# Patient Record
Sex: FEMALE | Race: WHITE | Employment: FULL TIME | URBAN - NONMETROPOLITAN AREA
[De-identification: names, ages, dates, MRNs, and addresses within clinical notes are randomized per-mention and may not be internally consistent; named-entity substitution may affect disease eponyms.]

---

## 2017-04-20 LAB — PAP-ABSTRACT: NORMAL

## 2017-10-25 ENCOUNTER — TRANSFERRED RECORDS (OUTPATIENT)
Dept: HEALTH INFORMATION MANAGEMENT | Facility: HOSPITAL | Age: 32
End: 2017-10-25

## 2017-11-01 ENCOUNTER — HOSPITAL ENCOUNTER (INPATIENT)
Facility: HOSPITAL | Age: 32
LOS: 1 days | Discharge: HOME | End: 2017-11-02
Attending: FAMILY MEDICINE | Admitting: FAMILY MEDICINE
Payer: COMMERCIAL

## 2017-11-01 DIAGNOSIS — O09.293: Primary | ICD-10-CM

## 2017-11-01 DIAGNOSIS — Z3A.39 39 WEEKS GESTATION OF PREGNANCY: ICD-10-CM

## 2017-11-01 LAB
ERYTHROCYTE [DISTWIDTH] IN BLOOD BY AUTOMATED COUNT: 13.5 % (ref 10–15)
HCT VFR BLD AUTO: 37.5 % (ref 35–47)
HGB BLD-MCNC: 12.8 G/DL (ref 11.7–15.7)
MCH RBC QN AUTO: 31.8 PG (ref 26.5–33)
MCHC RBC AUTO-ENTMCNC: 34.1 G/DL (ref 31.5–36.5)
MCV RBC AUTO: 93 FL (ref 78–100)
PLATELET # BLD AUTO: 283 10E9/L (ref 150–450)
RBC # BLD AUTO: 4.03 10E12/L (ref 3.8–5.2)
WBC # BLD AUTO: 13.4 10E9/L (ref 4–11)

## 2017-11-01 PROCEDURE — 72200001 ZZH LABOR CARE VAGINAL DELIVERY SINGLE

## 2017-11-01 PROCEDURE — 36415 COLL VENOUS BLD VENIPUNCTURE: CPT | Performed by: FAMILY MEDICINE

## 2017-11-01 PROCEDURE — G0378 HOSPITAL OBSERVATION PER HR: HCPCS

## 2017-11-01 PROCEDURE — S0191 MISOPROSTOL, ORAL, 200 MCG: HCPCS | Performed by: FAMILY MEDICINE

## 2017-11-01 PROCEDURE — 12000029 ZZH R&B OB INTERMEDIATE

## 2017-11-01 PROCEDURE — 25000128 H RX IP 250 OP 636

## 2017-11-01 PROCEDURE — 25000132 ZZH RX MED GY IP 250 OP 250 PS 637: Performed by: FAMILY MEDICINE

## 2017-11-01 PROCEDURE — 12000027 ZZH R&B OB

## 2017-11-01 PROCEDURE — 85027 COMPLETE CBC AUTOMATED: CPT | Performed by: FAMILY MEDICINE

## 2017-11-01 PROCEDURE — 10907ZC DRAINAGE OF AMNIOTIC FLUID, THERAPEUTIC FROM PRODUCTS OF CONCEPTION, VIA NATURAL OR ARTIFICIAL OPENING: ICD-10-PCS | Performed by: FAMILY MEDICINE

## 2017-11-01 PROCEDURE — 25000132 ZZH RX MED GY IP 250 OP 250 PS 637

## 2017-11-01 RX ORDER — IBUPROFEN 800 MG/1
800 TABLET, FILM COATED ORAL
Status: COMPLETED | OUTPATIENT
Start: 2017-11-01 | End: 2017-11-01

## 2017-11-01 RX ORDER — ACETAMINOPHEN 325 MG/1
650 TABLET ORAL EVERY 4 HOURS PRN
Status: DISCONTINUED | OUTPATIENT
Start: 2017-11-01 | End: 2017-11-02 | Stop reason: HOSPADM

## 2017-11-01 RX ORDER — LANOLIN 100 %
OINTMENT (GRAM) TOPICAL
Status: DISCONTINUED | OUTPATIENT
Start: 2017-11-01 | End: 2017-11-02 | Stop reason: HOSPADM

## 2017-11-01 RX ORDER — OXYTOCIN 10 [USP'U]/ML
10 INJECTION, SOLUTION INTRAMUSCULAR; INTRAVENOUS ONCE
Status: COMPLETED | OUTPATIENT
Start: 2017-11-01 | End: 2017-11-01

## 2017-11-01 RX ORDER — MISOPROSTOL 200 UG/1
800 TABLET ORAL
Status: DISCONTINUED | OUTPATIENT
Start: 2017-11-01 | End: 2017-11-02 | Stop reason: HOSPADM

## 2017-11-01 RX ORDER — ONDANSETRON 2 MG/ML
4 INJECTION INTRAMUSCULAR; INTRAVENOUS EVERY 6 HOURS PRN
Status: DISCONTINUED | OUTPATIENT
Start: 2017-11-01 | End: 2017-11-02 | Stop reason: HOSPADM

## 2017-11-01 RX ORDER — NALOXONE HYDROCHLORIDE 0.4 MG/ML
.1-.4 INJECTION, SOLUTION INTRAMUSCULAR; INTRAVENOUS; SUBCUTANEOUS
Status: DISCONTINUED | OUTPATIENT
Start: 2017-11-01 | End: 2017-11-02 | Stop reason: HOSPADM

## 2017-11-01 RX ORDER — OXYTOCIN/0.9 % SODIUM CHLORIDE 30/500 ML
PLASTIC BAG, INJECTION (ML) INTRAVENOUS
Status: DISPENSED
Start: 2017-11-01 | End: 2017-11-02

## 2017-11-01 RX ORDER — FENTANYL CITRATE 50 UG/ML
50-100 INJECTION, SOLUTION INTRAMUSCULAR; INTRAVENOUS
Status: DISCONTINUED | OUTPATIENT
Start: 2017-11-01 | End: 2017-11-02 | Stop reason: HOSPADM

## 2017-11-01 RX ORDER — OXYTOCIN 10 [USP'U]/ML
10 INJECTION, SOLUTION INTRAMUSCULAR; INTRAVENOUS
Status: DISCONTINUED | OUTPATIENT
Start: 2017-11-01 | End: 2017-11-02 | Stop reason: HOSPADM

## 2017-11-01 RX ORDER — IBUPROFEN 800 MG/1
TABLET, FILM COATED ORAL
Status: COMPLETED
Start: 2017-11-01 | End: 2017-11-01

## 2017-11-01 RX ORDER — OXYTOCIN/0.9 % SODIUM CHLORIDE 30/500 ML
340 PLASTIC BAG, INJECTION (ML) INTRAVENOUS CONTINUOUS PRN
Status: DISCONTINUED | OUTPATIENT
Start: 2017-11-01 | End: 2017-11-02 | Stop reason: HOSPADM

## 2017-11-01 RX ORDER — OXYTOCIN 10 [USP'U]/ML
INJECTION, SOLUTION INTRAMUSCULAR; INTRAVENOUS
Status: COMPLETED
Start: 2017-11-01 | End: 2017-11-01

## 2017-11-01 RX ORDER — OXYCODONE HYDROCHLORIDE 5 MG/1
5-10 TABLET ORAL
Status: DISCONTINUED | OUTPATIENT
Start: 2017-11-01 | End: 2017-11-02 | Stop reason: HOSPADM

## 2017-11-01 RX ORDER — MISOPROSTOL 200 UG/1
200 TABLET ORAL
Status: DISCONTINUED | OUTPATIENT
Start: 2017-11-01 | End: 2017-11-02 | Stop reason: HOSPADM

## 2017-11-01 RX ORDER — IBUPROFEN 400 MG/1
400-800 TABLET, FILM COATED ORAL EVERY 6 HOURS PRN
Status: DISCONTINUED | OUTPATIENT
Start: 2017-11-01 | End: 2017-11-02 | Stop reason: HOSPADM

## 2017-11-01 RX ORDER — OXYCODONE AND ACETAMINOPHEN 5; 325 MG/1; MG/1
1 TABLET ORAL
Status: DISCONTINUED | OUTPATIENT
Start: 2017-11-01 | End: 2017-11-02 | Stop reason: HOSPADM

## 2017-11-01 RX ORDER — MISOPROSTOL 200 UG/1
200 TABLET ORAL
Status: CANCELLED | OUTPATIENT
Start: 2017-11-01

## 2017-11-01 RX ORDER — ACETAMINOPHEN 325 MG/1
650 TABLET ORAL EVERY 4 HOURS PRN
Status: DISCONTINUED | OUTPATIENT
Start: 2017-11-01 | End: 2017-11-01

## 2017-11-01 RX ADMIN — OXYTOCIN 10 UNITS: 10 INJECTION, SOLUTION INTRAMUSCULAR; INTRAVENOUS at 17:17

## 2017-11-01 RX ADMIN — MISOPROSTOL 20 MCG: 200 TABLET ORAL at 11:19

## 2017-11-01 RX ADMIN — MISOPROSTOL 40 MCG: 200 TABLET ORAL at 12:23

## 2017-11-01 RX ADMIN — MISOPROSTOL 20 MCG: 200 TABLET ORAL at 10:14

## 2017-11-01 RX ADMIN — IBUPROFEN 800 MG: 800 TABLET, FILM COATED ORAL at 17:32

## 2017-11-01 RX ADMIN — MISOPROSTOL 60 MCG: 200 TABLET ORAL at 14:40

## 2017-11-01 RX ADMIN — MISOPROSTOL 60 MCG: 200 TABLET ORAL at 13:30

## 2017-11-01 RX ADMIN — IBUPROFEN 800 MG: 800 TABLET ORAL at 17:32

## 2017-11-01 NOTE — IP AVS SNAPSHOT
MRN:[id number]                      After Visit Summary   11/1/2017    Brooke A Lund Blumberg    MRN: [id number]           Thank you!     Thank you for choosing [facility] for your care. Our goal is always to provide you with excellent care. Hearing back from our patients is one way we can continue to improve our services. Please take a few minutes to complete the written survey that you may receive in the mail after you visit with us. Thank you!        Patient Information     Date Of Birth          1985        Designated Caregiver       Most Recent Value    Caregiver    Will someone help with your care after discharge? yes    Name of designated caregiver Saurabh    Phone number of caregiver [phone]    Caregiver address [address] [place] [address]      About your hospital stay     You were admitted on:  November 1, 2017 You last received care in the:  [facility]    You were discharged on:  November 2, 2017        Reason for your hospital stay       Have a baby                  Who to Call     For medical emergencies, please call 911.  For non-urgent questions about your medical care, please call your primary care provider or clinic, [phone]          Attending Provider     Provider Specialty    Jake Nash MD Family Practice       Primary Care Provider Office Phone # Fax #    Jake Nash [phone]      Follow-up Appointments     Follow-up and recommended labs and tests        See me in one month for postpartum check.                  Further instructions from your care team         Postpartum Vaginal Delivery Instructions    Activity       Ask family and friends for help when you need it.    Do not place anything in your vagina for 6 weeks.    You are not restricted on other activities, but take it easy for a few weeks to allow your body to recover from delivery.  You are able to do any activities you feel up to that point.    No driving until  "you have stopped taking your pain medications (usually two weeks after delivery).     Call your health care provider if you have any of these symptoms:       Increased pain, swelling, redness, or fluid around your stiches from an episiotomy or perineal tear.    A fever above 100.4 F (38 C) with or without chills when placing a thermometer under your tongue.    You soak a sanitary pad with blood within 1 hour, or you see blood clots larger than a golf ball.    Bleeding that lasts more than 6 weeks.    Vaginal discharge that smells bad.    Severe pain, cramping or tenderness in your lower belly area.    A need to urinate more frequently (use the toilet more often), more urgently (use the toilet very quickly), or it burns when you urinate.    Nausea and vomiting.    Redness, swelling or pain around a vein in your leg.    Problems breastfeeding or a red or painful area on your breast.    Chest pain and cough or are gasping for air.    Problems coping with sadness, anxiety, or depression.  If you have any concerns about hurting yourself or the baby, call your provider immediately.     You have questions or concerns after you return home.     Keep your hands clean:  Always wash your hands before touching your perineal area and stitches.  This helps reduce your risk of infection.  If your hands aren't dirty, you may use an alcohol hand-rub to clean your hands. Keep your nails clean and short.        Pending Results     No orders found for last 3 day(s).            Admission Information     Date & Time Provider Department Dept. Phone    11/1/2017 Jake Nash MD [facility] [phone]      Your Vitals Were     Blood Pressure Pulse Temperature Respirations Height Weight    121/73 75 98.1  F (36.7  C) (Oral) 16 1.778 m (5' 10\") 109.8 kg (242 lb)    Pulse Oximetry BMI (Body Mass Index)                98% 34.72 kg/m2          MyChart Information     [facility] lets you send messages to your doctor, view your test " "results, renew your prescriptions, schedule appointments and more. To sign up, go to [email or web address] . Click on \"Log in\" on the left side of the screen, which will take you to the Welcome page. Then click on \"Sign up Now\" on the right side of the page.     You will be asked to enter the access code listed below, as well as some personal information. Please follow the directions to create your username and password.     Your access code is: [email or web address]  Expires: 2018  7:19 PM     Your access code will  in 90 days. If you need help or a new code, please call your [facility] clinic or [phone].        Care EveryWhere ID     This is your Care EveryWhere ID. This could be used by other organizations to access your [facility] medical records  [id number]        Equal Access to Services     KAYLA BECERRA : Gelacio Torres, ronald scott, jeni putnam, kev baptiste . So [facility] [phone].    ATENCIÓN: Si habla español, tiene a treviño disposición servicios gratuitos de asistencia lingüística. Hiro al [phone].    We comply with applicable federal civil rights laws and [place] laws. We do not discriminate on the basis of race, color, national origin, age, disability, sex, sexual orientation, or gender identity.               Review of your medicines      START taking        Dose / Directions    ibuprofen 200 MG tablet   Commonly known as:  ADVIL/MOTRIN        Dose:  600 mg   Take 3 tablets (600 mg) by mouth every 6 hours as needed for other (cramping)   Refills:  0         CONTINUE these medicines which have NOT CHANGED        Dose / Directions    lanolin ointment        Apply topically every hour as needed for dry skin (sore nipples)   Refills:  0       prenatal multivitamin plus iron 27-0.8 MG Tabs per tablet        Dose:  1 tablet   Take 1 tablet by mouth daily   Refills:  0       witch hazel-glycerin pad   Commonly known as:  TUCKS        " Apply topically every hour as needed for hemorrhoids   Refills:  0            Where to get your medicines      Some of these will need a paper prescription and others can be bought over the counter. Ask your nurse if you have questions.     You don't need a prescription for these medications     ibuprofen 200 MG tablet                Protect others around you: Learn how to safely use, store and throw away your medicines at www.disposemymeds.org.             Medication List: This is a list of all your medications and when to take them. Check marks below indicate your daily home schedule. Keep this list as a reference.      Medications           Morning Afternoon Evening Bedtime As Needed    ibuprofen 200 MG tablet   Commonly known as:  ADVIL/MOTRIN   Take 3 tablets (600 mg) by mouth every 6 hours as needed for other (cramping)   Last time this was given:  800 mg on 11/1/2017  5:32 PM                                lanolin ointment   Apply topically every hour as needed for dry skin (sore nipples)                                prenatal multivitamin plus iron 27-0.8 MG Tabs per tablet   Take 1 tablet by mouth daily                                witch hazel-glycerin pad   Commonly known as:  TUCKS   Apply topically every hour as needed for hemorrhoids

## 2017-11-01 NOTE — PLAN OF CARE
Pt. Rocking on birthing ball. Breathing well through contractions. Timing them on her watch. 2 minutes in between. FHT's 140's.

## 2017-11-01 NOTE — H&P
Brooke A Lund Blumberg is a 32 year old female  who presents at 39 weeks for cervical ripening. She lives 80 minutes from the hospital, now with extensive road construction as well. Her first labor she went form 3 to 10 in less than one hour. She was at 38+ weeks last time. With recent snowstorms has been especially concerned. We are doing cervical ripening and induction to allow safe labor and delivery. O+, GBS-.  She got caught in road construction this am and is 20 minutes late for her induction.

## 2017-11-01 NOTE — PLAN OF CARE
Induction of Labor Admit Note  Brooke A Lund Blumberg  MRN: [id number]  Gestational Age: 39w0d      Brooke A Lund Blumberg presents for induction of labor for elective.  Patient denies contractions, bleeding or ROM.    History reviewed. No pertinent past medical history.  OB Induction Plan: Completed and available in epic chart.    Dr. Nash on the unit when patient arrived.      Patient is alert and oriented X 3, denies any pain. pain. Patient oriented to room, unit, hourly rounding, and plan of care. Call light within reach.  Explained admission packet with patient bill of rights brochure. Will continue to monitor and document as needed.     Inpatient nursing criteria listed below was met:    Health care directives status obtained and documented: Yes  Patient identifies a surrogate decision maker: Yes   If yes, who: Saurabh Larose  Core Measure diagnosis present:: Yes  Vaccine assessment done and vaccines ordered if appropriate. Yes  Clergy visit ordered if patient requests: N/A  Skin issues/needs documented:Yes  Isolation needs addressed, if appropriate: N/A  Fall Prevention (Med and High risk): Care plan updated, Education given and documented and signage used: Yes  Care Plan initiated: Yes  Education Documented (Reminder to educate patient if MRSA is present on admission): Yes  Education Assessment documented:Yes  Patient has discharge needs (If yes, please explain): Yes, routine postpartum and  cares    Plan:   Physician will see patien first. Cytotec induction

## 2017-11-01 NOTE — PLAN OF CARE
Pt. Agrees to try using nitrous oxide for contraction discomfort. Consent signed and nitrous set up.

## 2017-11-01 NOTE — L&D DELIVERY NOTE
Patient lives over 80 minutes from the hospital with road construction making the trip longer. Brought in a 39 weeks for elective induction. Cervix ripened with oral Cytotec. Once was 3 cm, AROM performed with contractions starting shortly thereafter. Baby born spontaneously after one minute second stage over a small first degree fourchette laceration. Mother and baby doing well bonding in the birthing room.    Delivery Summary        Labor Event Times    Labor onset date:  17 Onset time:   3:15 PM   Dilation complete date:  17 Complete time:   5:14 PM   Start pushing date/time:  2017 1713            Labor Length    1st Stage (hrs):  1 (min):  59   2nd Stage (hrs):  0 (min):  2   3rd Stage (hrs):  0 (min):  4      Labor Events     labor?:  No    steroids:  None   Labor Type:  AROM, Induction   Predominate monitoring during 1st stage:  intermittent electronic fetal monitoring      Antibiotics received during labor?:  No      Rupture date/time: 17 1510   Rupture type:  Artificial Rupture of Membranes   Fluid color:  Clear   Fluid odor:  Normal      Induction:  Misoprostol   Induction date/time:     Cervical ripening date/time:        1:1 continuous labor support provided by?:  RN          Delivery/Placenta Date and Time    Delivery Date:  17 Delivery Time:   5:16 PM   Placenta Date/Time:  2017  5:20 PM   Oxytocin given at the time of delivery:  after delivery of baby      Vaginal Counts    Initial count performed by 2 team members:   Two Team Members   Roxy Wolf Suture [employer]ong[employer] Instruments   Initial counts 1  15 10   Added to count       Final counts 1  15 10      Placed during labor Accounted for at the end of labor      Final count performed by 2 team members:   Two Team Members   Sita Simmons RN         Final count correct?:  Yes         Apgars    Living status:  Living    1 Minute 5 Minute 10 Minute 15 Minute 20  "Minute   Skin color: 1  1       Heart rate: 2  2       Reflex irritability: 2  2       Muscle tone: 2  2       Respiratory effort: 2  2       Total: 9  9          Apgars assigned by:  PHILLIP MOSHER      Cord    Vessels:  3 Vessels Complications:  None   Cord Blood Disposition:  Lab Gases Sent?:  No         [place] Resuscitation    Methods:  None      [place] Care at Delivery:  Baby boy delivered at 1716 over intact perineum.  Dried and stimulated Color pink with acrocyanosis of extremities. HR >100 RR in 50's with no nasal flaring, grunting or retracting. Baby is skin to skin with mom.     Output in Delivery Room:  Stool       Measurements    Weight:  8 lb 1.3 oz Length:  1' 8\"   Head circumference:  34.3 cm Chest circumference:  34.3 cm   Abdominal girth:  27.9 cm   Observed anomalies, comments:  none      Skin to Skin and Feeding Plan    Skin to skin initiation date/time: 17   Skin to skin with:  Mother   Skin to skin end date/time: 17 1820      Breastfeeding initiated date/time:  2017 174   How do you plan to feed your baby:  Breastfeeding      Labor Events and Shoulder Dystocia    Fetal Tracing Prior to Delivery:  Category 1   Shoulder dystocia present?:  Neg            Delivery (Maternal) (Provider to Complete) (610517)    Episiotomy:  None   Perineal lacerations:  1st Repaired?:  No   Est. blood loss (mL):  100         Mother's Information  Mother: Lund Blumberg, Brooke A #[id number]    Start of Mother's Information     IO Blood Loss  17 1515 - 17 1930    Mom's I/O Activity            End of Mother's Information  Mother: Lund Blumberg, Brooke A #[id number]            Delivery - Provider to Complete (681594)    Delivering clinician:  HEIDI NGUYEN   Attempted Delivery Types (Choose all that apply):  Spontaneous Vaginal Delivery   Delivery Type (Choose the 1 that will go to the Birth History):  Vaginal, Spontaneous Delivery                           Placenta    Delayed Cord " Clamping:  Done   Date/Time:  11/1/2017  5:20 PM   Removal:  Spontaneous   Disposition:  Hospital disposal      Anesthesia    Method:  Nitrous Oxide         Presentation and Position    Presentation:  Vertex   Position:  Right Occiput Anterior                    Jake Nash MD

## 2017-11-01 NOTE — PROGRESS NOTES
Went from long, high and closed to 3 cm, 30% effaced. Discussed AROM. She is feeling nothing from the cytotec as far as cramping. Offered AROM vs. One more dose Cytotec. Elects the Cytotec. Not julio on the monitor.

## 2017-11-01 NOTE — IP AVS SNAPSHOT
[facility]    [address] [address]    Phone:  [phone]    Fax:  [phone]                                       After Visit Summary   11/1/2017    Brooke A Lund Blumberg    MRN: [id number]           After Visit Summary Signature Page     I have received my discharge instructions, and my questions have been answered. I have discussed any challenges I see with this plan with the nurse or doctor.    ..........................................................................................................................................  Patient/Patient Representative Signature      ..........................................................................................................................................  Patient Representative Print Name and Relationship to Patient    ..................................................               ................................................  Date                                            Time    ..........................................................................................................................................  Reviewed by Signature/Title    ...................................................              ..............................................  Date                                                            Time

## 2017-11-01 NOTE — PLAN OF CARE
Dr. Nash notified of patient's status. Patient denies pain. Feels some cramping but nothing painful. Next cytotec dosing order modified to 60 ml of cytotec. Will continue to assess.

## 2017-11-02 VITALS
BODY MASS INDEX: 34.65 KG/M2 | DIASTOLIC BLOOD PRESSURE: 73 MMHG | HEIGHT: 70 IN | TEMPERATURE: 98.1 F | WEIGHT: 242 LBS | RESPIRATION RATE: 16 BRPM | SYSTOLIC BLOOD PRESSURE: 121 MMHG | OXYGEN SATURATION: 98 % | HEART RATE: 75 BPM

## 2017-11-02 RX ORDER — IBUPROFEN 200 MG
600 TABLET ORAL EVERY 6 HOURS PRN
Status: ON HOLD | COMMUNITY
Start: 2017-11-02 | End: 2021-04-30

## 2017-11-02 NOTE — DISCHARGE INSTRUCTIONS

## 2017-11-02 NOTE — DISCHARGE SUMMARY
[facility] Obst[facility] Post-Partum Progress Note          Assessment and Plan:    Assessment:   Post-partum day #1  Normal spontaneous vaginal delivery  L&D complications: None      Doing well.      Plan:   Breast feeding strategies discussed  Discharge later today           Interval History:   Doing well.  Pain is well-controlled.  No fevers.  No history of foul-smelling vaginal discharge.  Good appetite.  Denies chest pain, shortness of breath, nausea or vomiting.  Vaginal bleeding is similar to a heavy menstrual flow.  Ambulatory.  Breastfeeding well.            Significant Problems:    None          Review of Systems:    A little perineal soreness and cramping with nursing.          Medications:      Lund Blumberg, Brooke A   Home Medication Instructions MARIA T:[id number]    Printed on:11/02/17 0889   Medication Information                      ibuprofen (ADVIL/MOTRIN) 200 MG tablet  Take 3 tablets (600 mg) by mouth every 6 hours as needed for other (cramping)             lanolin ointment  Apply topically every hour as needed for dry skin (sore nipples)             Prenatal Vit-Fe Fumarate-FA (PRENATAL MULTIVITAMIN  PLUS IRON) 27-0.8 MG TABS  Take 1 tablet by mouth daily             witch hazel-glycerin (TUCKS) pad  Apply topically every hour as needed for hemorrhoids                         Physical Exam:   All vitals stable  Uterine fundus is firm, non-tender and at the level of the umbilicus          Data:     Lab Results   Component Value Date    WBC 13.4 (H) 11/01/2017    HGB 12.8 11/01/2017    HCT 37.5 11/01/2017     11/01/2017     No imaging studies have been ordered  Jake Nash MD

## 2017-11-02 NOTE — PLAN OF CARE
Problem: Patient Care Overview  Goal: Plan of Care/Patient Progress Review  Outcome: Improving  Assessments completed as charted. B/P: 135/81, T: 97.7, P: 87, R: 16. Rates pain: 0/10. Has some burning from perineal tear but doesn't describe as painful. Voiding without difficulty. Fundus: Midline @U-2. Lochia: Light. Activity: normal activity. Infant feeding: Breast feeding going well.      LATCH Score:   Latch: 2 - Good Latch  Audible Swallowin - Spontaneous & frequent  Type of Nipple: (Breast/Nipple) 2 - Everted  Comfort: 2 - Soft, Nontender  Hold: 2 - No Assist   Total LATCH Score: 10     Postpartum breastfeeding assessment completed and education provided, see Patient Education Activity.  Items included in the education are:     proper positioning and latch    effectiveness of feeding    manual expression    handling and storing breastmilk    maintenance of breastfeeding for the first 6 months    sign/symptoms of infant feeding issues requiring referral to qualified health care provider  Postpartum care education provided, see Patient Education activity. Patient denies needs. Will monitor.  Ann Simmons           Problem: Postpartum (Vaginal Delivery) (Adult,Obstetrics,Pediatric)  Goal: Signs and Symptoms of Listed Potential Problems Will be Absent, Minimized or Managed (Postpartum)  Signs and symptoms of listed potential problems will be absent, minimized or managed by discharge/transition of care (reference Postpartum (Vaginal Delivery) (Adult,Obstetrics,Pediatric) CPG).   Outcome: Improving    17 0809   Postpartum (Vaginal Delivery)   Problems Assessed (Postpartum Vaginal Delivery) all   Problems Present (Postpartum Vag Deliv) none

## 2017-11-02 NOTE — PLAN OF CARE
Pt. Up to void. Voided a large amount. Denies dizziness and steady on her feet. Linens changed. VSS. Lochia stable. Denies pain. Reports good relief from the icepack on perineum.

## 2017-11-02 NOTE — PLAN OF CARE
Vaginal Delivery Note   of viable Male.  Nursery RN Sita present.  Infant with spontaneous cry, to mother's abdomen, dried and stimulated. 10 units of IM pitocin given to the mother. Trsiha cares provided.  Mother and baby in stable condition. Baby skin-to-skin with mom. ID bands placed on infant, mom and Dad Saurabh.    175.26

## 2017-11-02 NOTE — PROGRESS NOTES
Patient:   Jennie      Lives at: home  Lives with: spouse and son  Support System: spouse     Primary PCP:  Jake Nash  OB:  Jake Nash   Baby PCP: Jake Nash  Insurance: yes  Pregnancy Hx:   Yes has a 3 year old son       Agency Contacts: none  Mental Health: none  Violence: no  Substance Abuse: no    Adequate resources for needs (housing, utilities, food/med): yes    Transportation:  YES,    Car Seat: Yes  Breast Pump:  Yes  Formula: No  Diapers:  Yes  Crib or Bassinet: both     Education concerns on self/baby care:   None    Met with Jennie this morning. Jennie lives with her  and three year old son in [place]. Jennie sees Dr. Nash for her primary care as well as her three year old son, she plans to have baby see Dr. Nash. Jennie states she has a stable job as a , and works at home. Her  Leonardo works at the local [employer]. Jennie denies any financial concerns. Jennie states she has everything she needs at home including, diapers, bassinet, and crib. She has no concerns returning home with baby, her  will provide transportation.

## 2017-11-02 NOTE — PLAN OF CARE
Problem: Patient Care Overview  Goal: Plan of Care/Patient Progress Review  Outcome: Improving    17 0551   OTHER   Plan Of Care Reviewed With patient   Plan of Care Review   Progress improving   Assessments completed as charted. B/P: 125/63, T: 98.2, P: 87, R: 16. Rates pain: 0/10 declines offers of PRN medication threw the night. Voiding without difficulty. Fundus: Midline firm u/1 . Lochia: Light. Activity: moving with difficulty due to perineal pain. Infant feeding: Breast feeding going well.      LATCH Score:   Latch: 2 - Good Latch  Audible Swallowin - Spontaneous & frequent  Type of Nipple: (Breast/Nipple) 2 - Everted  Comfort: 2 - Soft, Nontender  Hold: 2 - No Assist   Total LATCH Score: 10     Postpartum breastfeeding assessment completed and education provided, see Patient Education Activity.  Items included in the education are:     proper positioning and latch    effectiveness of feeding    manual expression    handling and storing breastmilk    maintenance of breastfeeding for the first 6 months    sign/symptoms of infant feeding issues requiring referral to qualified health care provider  Postpartum care education provided, see Patient Education activity. Patient denies needs. Will monitor.  Teetee Uriarte           Problem: Postpartum (Vaginal Delivery) (Adult,Obstetrics,Pediatric)  Goal: Signs and Symptoms of Listed Potential Problems Will be Absent, Minimized or Managed (Postpartum)  Signs and symptoms of listed potential problems will be absent, minimized or managed by discharge/transition of care (reference Postpartum (Vaginal Delivery) (Adult,Obstetrics,Pediatric) CPG).   Outcome: Improving    17 0551   Postpartum (Vaginal Delivery)   Problems Assessed (Postpartum Vaginal Delivery) all   Problems Present (Postpartum Vag Deliv) none

## 2017-11-03 NOTE — PLAN OF CARE
Face to face report given with opportunity to observe patient.    Report given to Fátima MOSHER.    Roxy Dias   11/2/2017  7:13 PM

## 2017-11-03 NOTE — PLAN OF CARE
Patient discharged at 1955 accompanied by spouse and baby.  Denies pain/discomfort. Ambulating without difficulty.  AVS reviewed and signed-no further questions.

## 2017-11-15 ENCOUNTER — TELEPHONE (OUTPATIENT)
Dept: OBGYN | Facility: HOSPITAL | Age: 32
End: 2017-11-15
Payer: COMMERCIAL

## 2017-11-15 NOTE — TELEPHONE ENCOUNTER
"1. How was your birth experience at [facility]?  \"Excellent\"  2. I see when you were discharged from the hospital that you were breast  feeding your baby. If breastfeeding, are you still exclusively breastfeeding?   yes  3. Before your baby was born, were you educated on breastfeeding?  yes  4. Did you and your baby have skin to skin contact immediately after birth?  yes  5. Were you able to offer your baby the breast for the first time within 1 hour after birth?  yes  6. Was the staff supportive and did they educate you on breastfeeding? (Example: Feeding on demand, breastfeeding without supplementation, no pacifiers, checking baby's latch, etc.)  yes  7. Did your baby room-in with you during your hospital stay?  yes  8. Were you told how to contact someone for breastfeeding support or how to make an outpatient lactation appointment if not already done for you?  yes  9. Overall, were you happy with your experience with infant feeding and bonding with your baby?  yes  10. Did you completely understand your discharge instructions prior to leaving the hospital?  yes  11. We always want to provide exceptional care. How was your overall care?  \"Exceptional\"   12. Is there anything we could have done differently to meet your needs?  \"No, everyone was very professional.\"  13. Is there anything I can do before I go?  No    Thank you for your time today and for choosing [facility] for your care. We wish you and your family good health and much happiness.     Claudia Lima  November 15, 2017    "

## 2021-04-30 ENCOUNTER — HOSPITAL ENCOUNTER (INPATIENT)
Facility: HOSPITAL | Age: 36
LOS: 1 days | Discharge: HOME | End: 2021-05-01
Attending: FAMILY MEDICINE | Admitting: FAMILY MEDICINE
Payer: COMMERCIAL

## 2021-04-30 DIAGNOSIS — Z34.90 ENCOUNTER FOR ELECTIVE INDUCTION OF LABOR: Primary | ICD-10-CM

## 2021-04-30 LAB
ABO + RH BLD: NORMAL
ABO + RH BLD: NORMAL
BLD GP AB SCN SERPL QL: NORMAL
BLOOD BANK CMNT PATIENT-IMP: NORMAL
HGB BLD-MCNC: 12.8 G/DL (ref 11.7–15.7)
PLATELET # BLD AUTO: 276 10E9/L (ref 150–450)
SPECIMEN EXP DATE BLD: NORMAL

## 2021-04-30 PROCEDURE — 86901 BLOOD TYPING SEROLOGIC RH(D): CPT | Performed by: FAMILY MEDICINE

## 2021-04-30 PROCEDURE — 85049 AUTOMATED PLATELET COUNT: CPT | Performed by: FAMILY MEDICINE

## 2021-04-30 PROCEDURE — 85018 HEMOGLOBIN: CPT | Performed by: FAMILY MEDICINE

## 2021-04-30 PROCEDURE — 86900 BLOOD TYPING SEROLOGIC ABO: CPT | Performed by: FAMILY MEDICINE

## 2021-04-30 PROCEDURE — 120N000001 HC R&B MED SURG/OB

## 2021-04-30 PROCEDURE — 250N000011 HC RX IP 250 OP 636: Performed by: FAMILY MEDICINE

## 2021-04-30 PROCEDURE — 36415 COLL VENOUS BLD VENIPUNCTURE: CPT | Performed by: FAMILY MEDICINE

## 2021-04-30 PROCEDURE — 10907ZC DRAINAGE OF AMNIOTIC FLUID, THERAPEUTIC FROM PRODUCTS OF CONCEPTION, VIA NATURAL OR ARTIFICIAL OPENING: ICD-10-PCS | Performed by: FAMILY MEDICINE

## 2021-04-30 PROCEDURE — 86850 RBC ANTIBODY SCREEN: CPT | Performed by: FAMILY MEDICINE

## 2021-04-30 PROCEDURE — 722N000001 HC LABOR CARE VAGINAL DELIVERY SINGLE

## 2021-04-30 PROCEDURE — 250N000013 HC RX MED GY IP 250 OP 250 PS 637: Performed by: FAMILY MEDICINE

## 2021-04-30 RX ORDER — TRANEXAMIC ACID 10 MG/ML
1 INJECTION, SOLUTION INTRAVENOUS EVERY 30 MIN PRN
Status: DISCONTINUED | OUTPATIENT
Start: 2021-04-30 | End: 2021-05-01 | Stop reason: HOSPADM

## 2021-04-30 RX ORDER — ACETAMINOPHEN 325 MG/1
650 TABLET ORAL EVERY 4 HOURS PRN
Status: CANCELLED | OUTPATIENT
Start: 2021-04-30

## 2021-04-30 RX ORDER — TRANEXAMIC ACID 10 MG/ML
1 INJECTION, SOLUTION INTRAVENOUS EVERY 30 MIN PRN
Status: DISCONTINUED | OUTPATIENT
Start: 2021-04-30 | End: 2021-04-30

## 2021-04-30 RX ORDER — ACETAMINOPHEN 325 MG/1
650 TABLET ORAL EVERY 4 HOURS PRN
Status: DISCONTINUED | OUTPATIENT
Start: 2021-04-30 | End: 2021-05-01 | Stop reason: HOSPADM

## 2021-04-30 RX ORDER — OXYCODONE AND ACETAMINOPHEN 5; 325 MG/1; MG/1
1 TABLET ORAL
Status: DISCONTINUED | OUTPATIENT
Start: 2021-04-30 | End: 2021-05-01 | Stop reason: HOSPADM

## 2021-04-30 RX ORDER — IBUPROFEN 200 MG
800 TABLET ORAL
Status: CANCELLED | OUTPATIENT
Start: 2021-04-30

## 2021-04-30 RX ORDER — OXYTOCIN 10 [USP'U]/ML
10 INJECTION, SOLUTION INTRAMUSCULAR; INTRAVENOUS
Status: COMPLETED | OUTPATIENT
Start: 2021-04-30 | End: 2021-04-30

## 2021-04-30 RX ORDER — NALOXONE HYDROCHLORIDE 0.4 MG/ML
0.4 INJECTION, SOLUTION INTRAMUSCULAR; INTRAVENOUS; SUBCUTANEOUS
Status: CANCELLED | OUTPATIENT
Start: 2021-04-30

## 2021-04-30 RX ORDER — OXYTOCIN 10 [USP'U]/ML
10 INJECTION, SOLUTION INTRAMUSCULAR; INTRAVENOUS ONCE
Status: CANCELLED | OUTPATIENT
Start: 2021-04-30 | End: 2021-04-30

## 2021-04-30 RX ORDER — FENTANYL CITRATE 50 UG/ML
50-100 INJECTION, SOLUTION INTRAMUSCULAR; INTRAVENOUS
Status: CANCELLED | OUTPATIENT
Start: 2021-04-30

## 2021-04-30 RX ORDER — NALOXONE HYDROCHLORIDE 0.4 MG/ML
0.2 INJECTION, SOLUTION INTRAMUSCULAR; INTRAVENOUS; SUBCUTANEOUS
Status: CANCELLED | OUTPATIENT
Start: 2021-04-30

## 2021-04-30 RX ORDER — NALOXONE HYDROCHLORIDE 0.4 MG/ML
0.2 INJECTION, SOLUTION INTRAMUSCULAR; INTRAVENOUS; SUBCUTANEOUS
Status: DISCONTINUED | OUTPATIENT
Start: 2021-04-30 | End: 2021-05-01 | Stop reason: HOSPADM

## 2021-04-30 RX ORDER — OXYTOCIN/0.9 % SODIUM CHLORIDE 30/500 ML
100 PLASTIC BAG, INJECTION (ML) INTRAVENOUS CONTINUOUS
Status: DISCONTINUED | OUTPATIENT
Start: 2021-04-30 | End: 2021-05-01 | Stop reason: HOSPADM

## 2021-04-30 RX ORDER — ONDANSETRON 2 MG/ML
4 INJECTION INTRAMUSCULAR; INTRAVENOUS EVERY 6 HOURS PRN
Status: DISCONTINUED | OUTPATIENT
Start: 2021-04-30 | End: 2021-05-01 | Stop reason: HOSPADM

## 2021-04-30 RX ORDER — NALOXONE HYDROCHLORIDE 0.4 MG/ML
0.4 INJECTION, SOLUTION INTRAMUSCULAR; INTRAVENOUS; SUBCUTANEOUS
Status: DISCONTINUED | OUTPATIENT
Start: 2021-04-30 | End: 2021-05-01 | Stop reason: HOSPADM

## 2021-04-30 RX ORDER — ONDANSETRON 2 MG/ML
4 INJECTION INTRAMUSCULAR; INTRAVENOUS EVERY 6 HOURS PRN
Status: CANCELLED | OUTPATIENT
Start: 2021-04-30

## 2021-04-30 RX ORDER — MODIFIED LANOLIN
OINTMENT (GRAM) TOPICAL
Status: DISCONTINUED | OUTPATIENT
Start: 2021-04-30 | End: 2021-05-01 | Stop reason: HOSPADM

## 2021-04-30 RX ORDER — OXYCODONE AND ACETAMINOPHEN 5; 325 MG/1; MG/1
1 TABLET ORAL
Status: CANCELLED | OUTPATIENT
Start: 2021-04-30

## 2021-04-30 RX ORDER — ACETAMINOPHEN 325 MG/1
650 TABLET ORAL EVERY 4 HOURS PRN
Status: DISCONTINUED | OUTPATIENT
Start: 2021-04-30 | End: 2021-04-30

## 2021-04-30 RX ORDER — FENTANYL CITRATE 50 UG/ML
50-100 INJECTION, SOLUTION INTRAMUSCULAR; INTRAVENOUS
Status: DISCONTINUED | OUTPATIENT
Start: 2021-04-30 | End: 2021-05-01 | Stop reason: HOSPADM

## 2021-04-30 RX ORDER — IBUPROFEN 800 MG/1
800 TABLET, FILM COATED ORAL
Status: DISCONTINUED | OUTPATIENT
Start: 2021-04-30 | End: 2021-04-30

## 2021-04-30 RX ORDER — OXYTOCIN/0.9 % SODIUM CHLORIDE 30/500 ML
340 PLASTIC BAG, INJECTION (ML) INTRAVENOUS CONTINUOUS PRN
Status: DISCONTINUED | OUTPATIENT
Start: 2021-04-30 | End: 2021-05-01 | Stop reason: HOSPADM

## 2021-04-30 RX ORDER — IBUPROFEN 800 MG/1
800 TABLET, FILM COATED ORAL EVERY 6 HOURS PRN
Status: DISCONTINUED | OUTPATIENT
Start: 2021-04-30 | End: 2021-05-01 | Stop reason: HOSPADM

## 2021-04-30 RX ORDER — OXYTOCIN 10 [USP'U]/ML
10 INJECTION, SOLUTION INTRAMUSCULAR; INTRAVENOUS
Status: DISCONTINUED | OUTPATIENT
Start: 2021-04-30 | End: 2021-05-01 | Stop reason: HOSPADM

## 2021-04-30 RX ADMIN — MISOPROSTOL 20 MCG: 200 TABLET ORAL at 12:44

## 2021-04-30 RX ADMIN — IBUPROFEN 800 MG: 800 TABLET ORAL at 14:51

## 2021-04-30 RX ADMIN — OXYTOCIN 10 UNITS: 10 INJECTION INTRAVENOUS at 13:54

## 2021-04-30 ASSESSMENT — ACTIVITIES OF DAILY LIVING (ADL)
TOILETING_ISSUES: NO
FALL_HISTORY_WITHIN_LAST_SIX_MONTHS: NO

## 2021-04-30 NOTE — PLAN OF CARE
Pt's contractions every 6-7 minutes prior to cytotec dose.  Contractions then every 3.  Pt rating contraction discomfort 4/10, declines wanting anything for pain.  Lavender provided.  Pt up ad live ambulating. Dr Nash paged and updated of more frequent contractions, as pt has a hx of fast labors.  Dr Nash stated she would come in.  Will continue to monitor pt and provide cares as needed.

## 2021-04-30 NOTE — PLAN OF CARE
Pt up ad live in the room and on the birthing ball.  Monitoring per protocol.  Pt reports contractions every 12 minutes.  Dr Nash call the Unit for an update on pt's status.  Orders obtained for cytotec solution.

## 2021-04-30 NOTE — LACTATION NOTE
"Initial Lactation Consultation    Brooke A Lund Blumberg                                                                                                    [id number]    Consultation Date: 2021    Reason for Lactation Referral:routine lactation assessment.    MATERNAL HISTORY   Maternal History: 3rd baby, vaginal delivery, no complications  History of Breast Surgery: No  Breast Changes During Pregnancy: Yes  Breast Feeding History: Yes,  successful, Length of Time: 18 mo, and 12 mo, with her first two sons.  Maternal Meds: see eMar    MATERNAL ASSESSMENT    Breast Size: average  Nipple Appearance - Left: intact  Nipple Appearance - Right: intact  Nipple Erectility - Left: erect with stimulation  Nipple Erectility - Right: erect with stimulation  Areolas Compressibility: soft  Nipple Size: average  Milk Supply: colostrum    INFANT ASSESSMENT    Oral Anatomy  Mouth: normal  Palate: normal  Jaw: normal  Tongue: normal    FEEDING   Feeding Time:1445  Position: left breast, cradle  Effort to Latch: awake and alert, latched easily  Results: excellent breast feed    FEEDING PLAN    Inpatient Feeding Plan: Nurse on demand, responding to infant's feeding cues. Snuggle in skin-to-skin to learn positioning and infant cues. Rooming-in encouraged.    LACTATION COMMENTS   Anticipatory guidance provided in regard to \"baby's second night.\"    Link provided for Maximizing Milk Production at [employer] by Dr. Libia Nunez.    Deep latch explained for proper positioning of breast in infant's mouth, maximizing milk transfer and comfort.  Hand expression taught and return demonstration observed with colostrum present.  [place] signs of satiety reviewed.  \"Ways to know that baby is getting enough\" discussed thoroughly.  [facility] handouts given on Mastitis, Engorgement, Managing Milk Supply, Milk Expression, Hand Expression  Follow-up support information " provided.        __________________________________________________________________________________  KEISHA VILLELA RN, IBCLC  4/30/2021

## 2021-04-30 NOTE — PLAN OF CARE
of a viable baby girl, with Dr Nash in attendance.  Baby delivered, placed skin to skin with mom.  Dried and stimulated.  Apgars of 8 and 9.  1st degree laceration, no repair.  Fundal checks and vitals per protocol.  Fundus firm at the U, light lochia noted.  Trisha-cares provided.  Ice pack to perineum.  Pt up to the bathroom with standby assist of 2, able to void without difficulty.  Linen changed.  Pt breastfeeding independently.  Denies any complaints at this time.  Will continue to monitor pt and provide cares as needed.   Pt is aware to call with needs.

## 2021-04-30 NOTE — PLAN OF CARE
Induction of Labor Admit Note  Brooke A Lund Blumberg  MRN: [id number]  Gestational Age: 39 6/7      Brooke A Lund Blumberg presents for induction of labor for elective.  Patient denies contractions, bleeding or ROM.      OB Induction Plan: Completed and available in epic chart.  Dr. Nash on the Unit upon pt's arrival.  Dr Nash in the room, SVE per MD.  Cervix was 3/50%/posterior.  AROM per MD, clear fluid noted.    Patient is alert and oriented X 3, denies any pain. pain. Patient oriented to room, unit, hourly rounding, and plan of care. Call light within reach.  Explained admission packet with patient bill of rights brochure. Will continue to monitor and document as needed.     Inpatient nursing criteria listed below was met:    Health care directives status obtained and documented: Yes  Patient identifies a surrogate decision maker: Yes   If yes, who:Saurabh-spouse Contact Information:see facesheet  Core Measure diagnosis present:: No  Vaccine assessment done and vaccines ordered if appropriate.  Current on vaccines.   Clergy visit ordered if patient requests: declined  Skin issues/needs documented:N/A  Isolation needs addressed, if appropriate: N/A  Fall Prevention (Med and High risk): Care plan updated, Education given and documented and signage used: Yes  Care Plan initiated: Yes  Education Documented (Reminder to educate patient if MRSA is present on admission): Yes  Education Assessment documented:Yes  Patient has discharge needs (If yes, please explain): No    Plan:   AROM, plan for pt to be up ad live and see if labor progresses.

## 2021-04-30 NOTE — H&P
Brooke A Lund Blumberg is a 35 year old female  who has had no significant change in her overall health status. See her prenatal record.  Uncomplicated prenatal course. She lives in [place] with a history of fast labors. She is being electively induced at 39 6/7 weeks because of concern over not making it to the hospital. Pros and cons including risk of failed induction discussed. She elects to proceed.     Cervix 3+cm, stretchy and soft. 50% effaced. Having occasional contractions. Baby vertex. FHT category 1. AROM with clear fluid.

## 2021-05-01 VITALS
BODY MASS INDEX: 34.23 KG/M2 | SYSTOLIC BLOOD PRESSURE: 122 MMHG | HEIGHT: 71 IN | OXYGEN SATURATION: 96 % | RESPIRATION RATE: 16 BRPM | HEART RATE: 78 BPM | TEMPERATURE: 97.8 F | DIASTOLIC BLOOD PRESSURE: 80 MMHG

## 2021-05-01 RX ORDER — MODIFIED LANOLIN
OINTMENT (GRAM) TOPICAL
COMMUNITY
Start: 2021-05-01

## 2021-05-01 NOTE — PLAN OF CARE
Patient discharged at 5:30PM via ambulation accompanied by spouse and staff. Prescriptions - None ordered for discharge. All belongings sent with patient.     Discharge instructions reviewed with pt and spouse. Listed belongings gathered and returned to patient. Clothing, cell phone, diaper bag    Patient discharged to home.     Core Measures and Vaccines  Core Measures applicable during stay: no  Pneumonia and Influenza given prior to discharge, if indicated: N/A    Surgical Patient   Surgical Procedures during stay: no  Did patient receive discharge instruction on wound care and recognition of infection symptoms?  Inderjit cano    [place]  Follow up appointment made:   pt aware of need to call the Clinic on Monday morning to schedule a follow up appointment for 1 month  Home and hospital aquired medications returned to patient: N/A  Patient reports pain was well managed at discharge: Yes

## 2021-05-01 NOTE — PLAN OF CARE
Assessments completed as charted. B/P: 124/79, T: 98.3, P: 78, R: 16. Rates pain: 0/10 denies need for prn pain meds. Voiding without difficulty. Fundus: Midline at the U. Lochia: Light. Activity: unrestricted with out pain . Infant feeding: Breast feeding going well, per pt's report.  No feed observed yet this shift.     Postpartum care education provided, see Patient Education activity. Patient denies needs. Will monitor.  Grace Goodrich RN

## 2021-05-01 NOTE — PLAN OF CARE
Face to face report given with opportunity to observe patient.    Report given to Zunilda Goodrich RN   4/30/2021  7:16 PM

## 2021-05-01 NOTE — DISCHARGE INSTRUCTIONS
Postpartum Vaginal Delivery Instructions    Activity    Ask family and friends for help when you need it.  Do not place anything in your vagina until approved by your Physician .  You are not restricted on other activities, but take it easy for a few weeks to allow your body to recover from delivery.  You are able to do any activities you feel up to that point.  No driving until you have stopped taking narcotic pain medications.    Call your health care provider if you have any of these symptoms:    Increased pain, swelling, redness, or fluid around your stiches from an episiotomy or perineal tear.  A fever above 100.4 F (38 C) with or without chills when placing a thermometer under your tongue.  You soak a sanitary pad with blood within 1 hour, or you see blood clots larger than a golf ball.  Bleeding that lasts more than 6 weeks.  Vaginal discharge that smells bad.  Severe pain, cramping or tenderness in your lower belly area.  A need to urinate more frequently (use the toilet more often), more urgently (use the toilet very quickly), or it burns when you urinate.  Nausea and vomiting.  Redness, swelling or pain around a vein in your leg.  Problems breastfeeding or a red or painful area on your breast.  Chest pain and cough or are gasping for air.  Problems coping with sadness, anxiety, or depression.  If you have any concerns about hurting yourself or the baby, call your provider immediately. The Safe Place for Newborns law allows you to bring your baby to the hospital up to 7 days, no questions asked.  You have questions or concerns after you return home.    Keep your hands clean:  Always wash your hands before touching your perineal area.  This helps reduce your risk of infection.  If your hands aren't dirty, you may use an alcohol hand-rub to clean your hands. Keep your nails clean and short.     Call the Clinic on Monday to make a follow up postpartum appointment with Dr Nash.

## 2021-05-01 NOTE — PLAN OF CARE
Assessments as charted. B/P: 129/77, T: 98.4, P: 78, R: 14. Rates pain: 0/10. Voiding without difficulty. Fundus: Midline firm and at the umbilicus. Lochia: Light. Activity: unrestricted with out pain . Infant feeding: Breast feeding going pretty well, could do a deeper latch but pt states no pain or pinching at that time.      LATCH Score:   Latch: 1  Audible Swallowin - Spontaneous & frequent  Type of Nipple: (Breast/Nipple) 2 - Everted  Comfort: 2 - Soft, Nontender  Hold: 2 - No Assist   Total LATCH Score: 9    Postpartum breastfeeding assessment completed and education provided, see Patient Education Activity.  Items included in the education are:   proper positioning and latch  effectiveness of feeding  manual expression  handling and storing breastmilk  maintenance of breastfeeding for the first 6 months  sign/symptoms of infant feeding issues requiring referral to qualified health care provider  Postpartum care education provided, see Patient Education activity. Patient denies needs. Will monitor.  Ann Christine RN

## 2021-05-01 NOTE — DISCHARGE SUMMARY
[facility] Obstetrics Post-Partum Progress Note          Assessment and Plan:    Assessment:   Post-partum day #1  Normal spontaneous vaginal delivery  L&D complications: None      Doing well.      Plan:   Ambulation encouraged  Breast feeding strategies discussed  Deciding whether to go home today or tomorrow. I'm OK with either way.           Interval History:   Doing well.  Essentially no pain.  No fevers.  No history of foul-smelling vaginal discharge.  Good appetite.  Denies chest pain, shortness of breath, nausea or vomiting.  Vaginal bleeding is similar to a heavy menstrual flow.  Ambulatory.  Breastfeeding well, but baby very fussy overnight.          Significant Problems:    None          Review of Systems:    Nipples sore. Discussed breast feeding approaches.          Medications:      Lund Blumberg, Brooke A   Home Medication Instructions MARIA T:[id number]    Printed on:05/01/21 1031   Medication Information                      lanolin ointment  Apply topically every hour as needed for dry skin (sore nipples)             Prenatal Vit-Fe Fumarate-FA (PRENATAL MULTIVITAMIN  PLUS IRON) 27-0.8 MG TABS  Take 1 tablet by mouth daily                         Physical Exam:   All vitals stable  Uterine fundus is firm, non-tender and at the level of the umbilicus minus one.          Data:     Lab Results   Component Value Date    WBC 13.4 (H) 11/01/2017    HGB 12.8 04/30/2021    HCT 37.5 11/01/2017     04/30/2021     No imaging studies have been ordered    Jake Nash MD

## 2021-05-05 NOTE — L&D DELIVERY NOTE
presented for labor induction at 39 6/7 weeks. She lives in [place] and has a history of fast labors. AROM was performed as she was already 3 cm. When no labor occurred, one dose of oral misoprostol was given. This resulted in a one hour, seven minute labor. She had a rapid delivery of a 7# 6 oz baby girl. Mother and baby were doing fine.       Lund-Blumberg, Solveig Watts [[id number]]    Labor Event Times    Labor onset date: 21 Onset time: 12:45 PM   Dilation complete date: 21 Complete time:  1:52 PM   Start pushing date/time: 2021 1352      Labor Length    1st Stage (hrs): 1 (min): 7   2nd Stage (hrs): 0 (min): 1   3rd Stage (hrs): 0 (min): 5      Labor Events     labor?: No   steroids: None  Labor Type: Induction/Cervical ripening  Predominate monitoring during 1st stage: intermittent auscultation     Antibiotics received during labor?: No     Rupture identifier: Sac 1  Rupture date/time: 21 0936   Rupture type: Artificial Rupture of Membranes  Fluid color: Clear  Fluid odor: Normal     Induction: AROM, Misoprostol  Induction date/time:     Cervical ripening date/time:     Indications for induction: Elective     1:1 continuous labor support provided by?: RN Labor partogram used?: no      Delivery/Placenta Date and Time    Delivery Date: 21 Delivery Time:  1:53 PM   Placenta Date/Time: 2021  1:58 PM  Oxytocin given at the time of delivery: after delivery of baby  Delivering clinician: Jake Nsah MD   Other personnel present at delivery:  Provider Role   Jake Nash MD Family Medicine Physician         Vaginal Counts     Initial count performed by 2 team members:  Two Team Members   Brooke Simmons RN       Needles Suture Needles Sponges (RETIRED) Instruments   Initial counts 1 0 15    Added to count 0 0 0    Relief counts       Final counts 1 0 15          Placed during labor Accounted for at the end of labor   FSE No NA   IUPC No NA   Cervadil  "No NA              Final count performed by 2 team members:  Two Team Members   Brooke Goodrich RN   Zunilda Simmons RN      Final count correct?: Yes     Apgars    Living status: Living   1 Minute 5 Minute 10 Minute 15 Minute 20 Minute   Skin color: 1  1       Heart rate: 2  2       Reflex irritability: 2  2       Muscle tone: 1  2       Respiratory effort: 2  2       Total: 8  9       Apgars assigned by: ORQUIDEA SIMMONS RN     Cord    Vessels: 3 Vessels    Cord Complications: None               Cord Blood Disposition: Lab    Gases Sent?: No    Delayed cord clamping?: Yes    Stem cell collection?: No       [place] Resuscitation    Methods: None   Care at Delivery:  of viable infant female. Dr. Nash, nursery RN and RT present. Dried and stimulated. Lusty cry produced. Skin pinking up. LAI's. Placed skin to skin with mother.     [place] Measurements    Weight: 7 lb 6.3 oz Length: 1' 8\"   Head circumference: 33 cm Chest circumference: 33 cm   Abdominal girth: 30.5 cm     Skin to Skin and Feeding Plan    Skin to skin initiation date/time: 1841    Skin to skin with: Mother  Skin to skin end date/time: 1841    Breastfeeding initiated date/time: 2021 1406     Labor Events and Shoulder Dystocia    Fetal Tracing Prior to Delivery: Category 1  Shoulder dystocia present?: Neg     Delivery (Maternal) (Provider to Complete) (647321)    Episiotomy: None  Perineal lacerations: 1st    Genital tract inspection done: Pos     Blood Loss  Mother: Lachelle BlumbergJennie #[id number]   Start of Mother's Information    IO Blood Loss  21 1245 - 21 1353    None           End of Mother's Information  Mother: Lund Blumberg, Brooke KISHOR #[id number]          Delivery - Provider to Complete ([id number])    Delivering clinician: Jake Nash MD  Attempted Delivery Types (Choose all that apply): Spontaneous Vaginal Delivery  Delivery Type (Choose the 1 that will go to the Birth History): Vaginal, Spontaneous                 "   Other personnel:  Provider Role   Jake Nash MD Family Medicine Physician                Placenta    Date/Time: 4/30/2021  1:58 PM  Removal: Spontaneous  Disposition: Hospital disposal           Anesthesia    Method: None                Presentation and Position    Presentation: Vertex    Position: Right Occiput Anterior                 Jake Nash MD